# Patient Record
Sex: MALE | Race: WHITE | Employment: FULL TIME | URBAN - METROPOLITAN AREA
[De-identification: names, ages, dates, MRNs, and addresses within clinical notes are randomized per-mention and may not be internally consistent; named-entity substitution may affect disease eponyms.]

---

## 2017-08-25 ENCOUNTER — HOSPITAL ENCOUNTER (OUTPATIENT)
Dept: PHYSICAL THERAPY | Age: 57
Discharge: HOME | End: 2017-08-25
Payer: COMMERCIAL

## 2017-08-25 PROCEDURE — 97162 PT EVAL MOD COMPLEX 30 MIN: CPT

## 2017-08-25 PROCEDURE — 97110 THERAPEUTIC EXERCISES: CPT

## 2017-08-25 PROCEDURE — 97140 MANUAL THERAPY 1/> REGIONS: CPT

## 2017-08-25 NOTE — PROGRESS NOTES
[address] [facility] AT [address], [address] [place] [address] Road  Phone: [phone]   [phone]  PLAN OF CARE / [address] [place] [address] PHYSICAL THERAPY SERVICES  Patient Name: Edwin Mcduffie : 1960   Medical   Diagnosis: Right shoulder pain [M25.511] Treatment Diagnosis: M25.511   Onset Date: 17     Referral Source: Taya Soto MD Start of [facility]): 2017   Prior Hospitalization: See medical history Provider #: [id number]   Prior Level of Function: Independent w/ ADL's   Comorbidities: ^BMI, HTN   Medications: Verified on Patient Summary List   The Plan of Care and following information is based on the information from the initial evaluation.   ===========================================================================================  Assessment / key information:  Patient is a 62 y/o male presenting s/p R RTC repair and SAD (DOS 17). Pt reports traumatic injury 1 year ago falling down the stairs. Since surgery, pt reports good compliance with home sling use and pendulums as directed by surgeon. Abduction brace was removed yesterday. Pain intensity ranges from 2-7/10. Functional deficits include sleeping, inability to reach/lift, impaired washing/dressing ADL's. Patient presents to therapy donning sling on right upper extremity. Patient is mild/mod TTP to the acromion and greater tuberosity. PROM of the R shoulder is measured at 90 degrees flexion, 60 degrees abduction, +5 degrees ER with arm in plane of scapula. AROM/strength testing deferred. The patient was instructed in a home exercise program to address the above findings/deficits.  The patient should benefit from therapy services to progress toward functional goals and improve quality of life.  ===========================================================================================  History MEDIUM  Complexity : 1-2 comorbidities / personal factors will impact the outcome/ POC ; Examination MEDIUM Complexity : 3 Standardized tests and measures addressing body structure, function, activity limitation and / or participation in recreation ; Presentation MEDIUM Complexity : Evolving with changing characteristics ; Decision Making MEDIUM Complexity : FOTO score of 26-74; Complexity MEDIUM  Problem List: pain affecting function, decrease ROM, decrease strength, decrease ADL/ functional abilitiies, decrease activity tolerance and decrease flexibility/ joint mobility   Treatment Plan may include any combination of the following: Therapeutic exercise, Therapeutic activities, Physical agent/modality, Manual therapy and Patient education  Patient / Family readiness to learn indicated by: asking questions, trying to perform skills and interest  Persons(s) to be included in education: patient (P)  Barriers to Learning/Limitations: None  Measures taken:    Patient Goal (s): Shoulder mobility   Patient self reported health status: good  Rehabilitation Potential: good   Short Term Goals: To be accomplished in  6-9  treatments: Independent/compliant with long term HEP for shoulder ROM  Able to actively lift right shoulder to 90 degrees elevation with minimal substitution   Long Term Goals:  To be accomplished in  12-18  treatments:  Pt will report at least 75% ability to reach behind back for washing and bathing  Patient will report at least 75% functional overhead reaching ability to improve ADL's  Improve FOTO outcome score by 25% to indicate a significant improvement in ADL function  Achieve 4/5 strengths or better throughout right shoulder to improve active use of right upper extremity  Frequency / Duration:   Patient to be seen  2-3  times per week for 6  weeks:  Patient / Caregiver education and instruction: activity modification and exercises  G-Codes (GP): JOAQUÍN  Therapist Signature: Monica Allred PT, [facility] Date: 9/60/6324   Certification Period: JOAQUÍN Time: 2:11 PM ===========================================================================================  I certify that the above Physical Therapy Services are being furnished while the patient is under my care. I agree with the treatment plan and certify that this therapy is necessary. Physician Signature:        Date:       Time:     Please sign and return to [facility]ER[facility] or you may fax the signed copy to [phone]. Thank you.

## 2017-08-25 NOTE — PROGRESS NOTES
SHOULDER EVALUATION/ DAILY TREATMENT NOTE    Patient Name: John Cruz  Date:2017  : 1960  [x]  Patient  Verified  Payor: Maritza Bone / Plan: Suhas Gaston / Product Type: HMO /    In time:1:00  Out time:2:00  Total Treatment Time (min): 60  Total Timed Codes (min): 60  1:1 Treatment Time ( only):    Visit #: 1     Treatment Area: Right shoulder pain [M25.511]    SUBJECTIVE HISTORY:   Patient is a 60 y/o male presenting s/p R RTC repair and SAD (DOS 17). Pt reports good compliance with home sling use and pendulums as directed by surgeon. Abduction brace was removed yesterday. Pain intensity ranges from 2-7/10. Pain Level (0-10 scale): 2    Posture: Rounded shoulders R>L, R UE in sling    ROM:  [] Unable to assess at this time                                           AROM                                                              PROM   Left Right  Left Right   Flexion  NT Flexion  90   Extension   Extension     Abduction   Abduction  60   ER @ 0 Degrees   ER @ 0 Degrees  +5 degrees   ER @ 90 Degrees   ER @ 90 Degrees     IR @ 90 Degrees   IR @ 90 Degrees     Full elbow flex/ext    End Feel / Painful Arc:    Strength:   [x] Unable to assess at this time                                                                            L (1-5) R (1-5) Pain   Flexors   [] Yes   [] No   Abductors   [] Yes   [] No   External Rotators   [] Yes   [] No   Internal Rotators   [] Yes   [] No   Supraspinatus   [] Yes   [] No   Serratus Anterior   [] Yes   [] No   Lower Trapezius   [] Yes   [] No   Elbow Flexion   [] Yes   [] No   Elbow Extension   [] Yes   [] No       Scapulohumoral Control / Rhythm: NA  Able to eccentrically lower with good control?  Left: [] Yes   [] No     Right: [] Yes   [] No      Palpation:  [] Min  [x] Mod  [] Severe    Location: Right acromion, greater tuberosity  [] Min  [] Mod  [] Severe    Location:  [] Min  [] Mod  [] Severe    Location:    Special Tests:  Adson's Test  [] Pos   [] Neg Yergason's Test [] Pos   [] Neg  Mgegan's Test  [] Pos   [] Neg ER Lag Sign     [] Pos   [] Neg  Neer's Test  [] Pos   [] Neg IR Lift Off Sign  [] Pos   [] Neg  Hawkin's Test  [] Pos   [] Neg AC Joint  [] Pos   [] Neg  Speed's Test  [] Pos   [] Neg SC Joint  [] Pos   [] Neg  Empty Can  [] Pos   [] Neg Pectoral Tightness [] Pos   [] Neg  Anterior Apprehension [] Pos   [] Neg   Posterior Apprehension [] Pos   [] Neg  Other:     OBJECTIVE TREATMENT:  Modality (rationale):   []  E-Stim: type _ x _ min     []att   []unatt   []w/US   []w/ice   []w/heat  []  Ultrasound: []cont   []pulse    _ W/cm2 x _  min   []1MHz   []3MHz  []  Iontophoresis: []take home patch w/ dexamethazone    []40mA   []80mA                               []_ mA min w/: []dexamethazone   []other:_  []  Ice pack _  min     [] Hot pack _  min     [] Paraffin _  min  []  Other:     Therapeutic Exercise: [x] see flow sheet     [] Other:_  Added/Changed Exercises:  [x]  Added:See HEP_  to improve (function): Shoulder mobility to improve ADL's  []  Changed:_ to improve (function):  (minutes:10 )    Therapeutic Activity:   (minutes: )    Manual Therapy: PROM of the R scapula and GH joint  (minutes: 10)    Other Objective/Functional Measures:   Pain Level (0-10 scale) post treatment: 0    ASSESSMENT  [x]  See Plan of Care  Patient is assigned a medium evaluation complexity based upon presence of BMI/HTN, FOTO score, and changing/post-op symptom characteristics.     PLAN  See  Jose Cantu 8/25/2017  1:13 PM

## 2017-08-29 ENCOUNTER — HOSPITAL ENCOUNTER (OUTPATIENT)
Dept: PHYSICAL THERAPY | Age: 57
Discharge: HOME | End: 2017-08-29
Payer: COMMERCIAL

## 2017-08-29 PROCEDURE — 97110 THERAPEUTIC EXERCISES: CPT

## 2017-08-29 PROCEDURE — 97140 MANUAL THERAPY 1/> REGIONS: CPT

## 2017-08-29 PROCEDURE — 97016 VASOPNEUMATIC DEVICE THERAPY: CPT

## 2017-08-29 NOTE — PROGRESS NOTES
PT DAILY TREATMENT NOTE     Patient Name: Rodney Geiger  Date:2017  : 1960  [x]  Patient  Verified  Payor: Karen Williamson / Plan: Noelle Mcgraw / Product Type: HMO /    In time:2:02  Out time:2:44  Total Treatment Time (min): 42  Total Timed Codes (min): 42  1:1 Treatment Time (min):    Visit #: 2 of     Treatment Area: Right shoulder pain [M25.511]    SUBJECTIVE  Pain Level (0-10 scale): 4  Any medication changes, allergies to medications, adverse drug reactions, diagnosis change, or new procedure performed?: [x] No    [] Yes (see summary sheet for update)  Subjective functional status/changes:   [] No changes reported  My shoulder is pretty sore, I just took it out of the sling     OBJECTIVE  Modality rationale: decrease pain to improve the patients ability to change upper limb position for lifting, reaching and dressing tasks   Min Type Additional Details    [] Estim: []Att   []Unatt        []TENS instruct                  []IFC  []Premod   []NMES                     []Other:  []w/US   []w/ice   []w/heat  Position:  Location:    []  Traction: [] Cervical       []Lumbar                       [] Prone          []Supine                       []Intermittent   []Continuous Lbs:  [] before manual  [] after manual    []  Ultrasound: []Continuous   [] Pulsed                           []1MHz   []3MHz Location:  W/cm2:    []  Iontophoresis with dexamethasone         Location: [] Take home patch   [] In clinic    []  Ice     []  heat  []  Ice massage Position:  Location:   10 [x]  Vasopneumatic Device Pressure:       [] lo [x] med [] hi   Temperature: [x] lo [] med [] hi   [] Skin assessment post-treatment:  []intact []redness- no adverse reaction       []redness  adverse reaction:       22 min Therapeutic Exercise:  [] See flow sheet :   Rationale: increase ROM to improve the patients ability to change upper limb position for lifting, reaching and dressing tasks    10 min Manual Therapy: PROM of the R scap and [address] in all planes   Rationale: increase ROM and increase tissue extensibility to improve the patient's ability to change upper limb position for lifting, reaching and dressing tasks      Pain Level (0-10 scale) post treatment: 3    ASSESSMENT/Changes in Function: Pt was instructed to remain conservative with active use of involved arm now that he is out of the sling, but to remain active with HEP and encourage normal arm position and swing with walking and other ADL's. Patient will continue to benefit from skilled PT services to modify and progress therapeutic interventions, address ROM deficits, address strength deficits and analyze and cue movement patterns to attain remaining goals.      []  See Plan of Care  []  See progress note/recertification  []  See Discharge Summary           PLAN  []  Upgrade activities as tolerated     [x]  Continue plan of care  []  Update interventions per flow sheet       []  Discharge due to:_  []  Other:_      Sima Carpenter, PT 8/29/2017  2:44 PM

## 2017-08-31 ENCOUNTER — HOSPITAL ENCOUNTER (OUTPATIENT)
Dept: PHYSICAL THERAPY | Age: 57
Discharge: HOME | End: 2017-08-31
Payer: COMMERCIAL

## 2017-08-31 PROCEDURE — 97110 THERAPEUTIC EXERCISES: CPT

## 2017-08-31 PROCEDURE — 97140 MANUAL THERAPY 1/> REGIONS: CPT

## 2017-08-31 PROCEDURE — 97016 VASOPNEUMATIC DEVICE THERAPY: CPT

## 2017-08-31 NOTE — PROGRESS NOTES
PT DAILY TREATMENT NOTE     Patient Name: Jamir Draper  Date:2017  : 1960  [x]  Patient  Verified  Payor: Rosendo Oseguera / Plan: Polo Chan / Product Type: HMO /    In time:1:57  Out time:2:50  Total Treatment Time (min): 48  Visit #: 3 of     Treatment Area: Right shoulder pain [M25.511]    SUBJECTIVE  Pain Level (0-10 scale):5  Any medication changes, allergies to medications, adverse drug reactions, diagnosis change, or new procedure performed?: [x] No    [] Yes (see summary sheet for update)  Subjective functional status/changes:   [] No changes reported  \"Its been really really sore\"    OBJECTIVE  Modality rationale: decrease pain to improve the patients ability to change upper limb position for lifting, reaching and dressing tasks   Min Type Additional Details    [] Estim: []Att   []Unatt        []TENS instruct                  []IFC  []Premod   []NMES                     []Other:  []w/US   []w/ice   []w/heat  Position:  Location:    []  Traction: [] Cervical       []Lumbar                       [] Prone          []Supine                       []Intermittent   []Continuous Lbs:  [] before manual  [] after manual    []  Ultrasound: []Continuous   [] Pulsed                           []1MHz   []3MHz Location:  W/cm2:    []  Iontophoresis with dexamethasone         Location: [] Take home patch   [] In clinic    []  Ice     []  heat  []  Ice massage Position:  Location:   10 [x]  Vasopneumatic Device Pressure:       [] lo [x] med [] hi   Temperature: [x] lo [] med [] hi   [] Skin assessment post-treatment:  []intact []redness- no adverse reaction       []redness  adverse reaction:       33 min Therapeutic Exercise:  [] See flow sheet :   Rationale: increase ROM to improve the patients ability to change upper limb position for lifting, reaching and dressing tasks    10 min Manual Therapy:  PROM of the R scap and GH in all planes   Rationale: increase ROM and increase tissue extensibility to improve the patient's ability to change upper limb position for lifting, reaching and dressing tasks      Pain Level (0-10 scale) post treatment: 5    ASSESSMENT/Changes in Function: Pt continues to have increased soreness of the R shoulder following decreased use of the sling. Pt reports he walked 2.5 miles two days ago and reports trying to perform normal arm swing. Will continue to progress therex within current POC as patient is able. Patient will continue to benefit from skilled PT services to modify and progress therapeutic interventions, address ROM deficits, address strength deficits and analyze and cue movement patterns to attain remaining goals.      []  See Plan of Care  []  See progress note/recertification  []  See Discharge Summary           PLAN  []  Upgrade activities as tolerated     [x]  Continue plan of care  []  Update interventions per flow sheet       []  Discharge due to:_  []  Other:_      Eli Dunlap, PT 8/31/2017  2:44 PM

## 2017-09-05 ENCOUNTER — HOSPITAL ENCOUNTER (OUTPATIENT)
Dept: PHYSICAL THERAPY | Age: 57
Discharge: HOME | End: 2017-09-05
Payer: COMMERCIAL

## 2017-09-05 PROCEDURE — 97016 VASOPNEUMATIC DEVICE THERAPY: CPT

## 2017-09-05 PROCEDURE — 97140 MANUAL THERAPY 1/> REGIONS: CPT

## 2017-09-05 PROCEDURE — 97110 THERAPEUTIC EXERCISES: CPT

## 2017-09-05 NOTE — PROGRESS NOTES
PT DAILY TREATMENT NOTE     Patient Name: Cally Puckett  Date:2017  : 1960  [x]  Patient  Verified  Payor: Maria Del Carmen Mccullough / Plan: Real Colla / Product Type: HMO /    In time:203  Out time:258  Total Treatment Time (min):55  Visit #: 4 of     Treatment Area: Right shoulder pain [M25.511]    SUBJECTIVE  Pain Level (0-10 scale):2-3  Any medication changes, allergies to medications, adverse drug reactions, diagnosis change, or new procedure performed?: [x] No    [] Yes (see summary sheet for update)  Subjective functional status/changes:   [] No changes reported  \"Im still pretty sore\"    OBJECTIVE  Modality rationale: decrease pain to improve the patients ability to change upper limb position for lifting, reaching and dressing tasks   Min Type Additional Details    [] Estim: []Att   []Unatt        []TENS instruct                  []IFC  []Premod   []NMES                     []Other:  []w/US   []w/ice   []w/heat  Position:  Location:    []  Traction: [] Cervical       []Lumbar                       [] Prone          []Supine                       []Intermittent   []Continuous Lbs:  [] before manual  [] after manual    []  Ultrasound: []Continuous   [] Pulsed                           []1MHz   []3MHz Location:  W/cm2:    []  Iontophoresis with dexamethasone         Location: [] Take home patch   [] In clinic    []  Ice     []  heat  []  Ice massage Position:  Location:   10 [x]  Vasopneumatic Device Pressure:       [] lo [x] med [] hi   Temperature: [x] lo [] med [] hi   [] Skin assessment post-treatment:  []intact []redness- no adverse reaction       []redness  adverse reaction:       35 min Therapeutic Exercise:  [] See flow sheet :   Rationale: increase ROM to improve the patients ability to change upper limb position for lifting, reaching and dressing tasks    10 min Manual Therapy:  PROM of the R scap and GH in all planes   Rationale: increase ROM and increase tissue extensibility to improve the patient's ability to change upper limb position for lifting, reaching and dressing tasks      Pain Level (0-10 scale) post treatment: 5    ASSESSMENT/Changes in Function: Pt was able to progress to wall ladder today, however was educated on importance of only going to point he begins to feel pain and not push past that point towards sharp and stabbing pain. Pt tolerated eccentric lowering on the ladder well with no reports of pain. Will continue to progress therex within current POC as patient is able. Patient will continue to benefit from skilled PT services to modify and progress therapeutic interventions, address ROM deficits, address strength deficits and analyze and cue movement patterns to attain remaining goals.      []  See Plan of Care  []  See progress note/recertification  []  See Discharge Summary           PLAN  []  Upgrade activities as tolerated     [x]  Continue plan of care  []  Update interventions per flow sheet       []  Discharge due to:_  []  Other:_      Ronda Portillo, PT 9/5/2017

## 2017-09-07 ENCOUNTER — HOSPITAL ENCOUNTER (OUTPATIENT)
Dept: PHYSICAL THERAPY | Age: 57
Discharge: HOME | End: 2017-09-07
Payer: COMMERCIAL

## 2017-09-07 PROCEDURE — 97110 THERAPEUTIC EXERCISES: CPT

## 2017-09-07 PROCEDURE — 97016 VASOPNEUMATIC DEVICE THERAPY: CPT

## 2017-09-07 PROCEDURE — 97140 MANUAL THERAPY 1/> REGIONS: CPT

## 2017-09-07 NOTE — PROGRESS NOTES
PT DAILY TREATMENT NOTE     Patient Name: Tom Code  Date:2017  : 1960  [x]  Patient  Verified  Payor: Gege Hammond / Plan: Nani Mercer / Product Type: HMO /    In time:200  Out time:257  Total Treatment Time (min):57  Visit #: 6 of     Treatment Area: Right shoulder pain [M25.511]    SUBJECTIVE  Pain Level (0-10 scale):7  Any medication changes, allergies to medications, adverse drug reactions, diagnosis change, or new procedure performed?: [x] No    [] Yes (see summary sheet for update)  Subjective functional status/changes:   [] No changes reported  \"It is really sore today\"    OBJECTIVE  Modality rationale: decrease pain to improve the patients ability to change upper limb position for lifting, reaching and dressing tasks   Min Type Additional Details    [] Estim: []Att   []Unatt        []TENS instruct                  []IFC  []Premod   []NMES                     []Other:  []w/US   []w/ice   []w/heat  Position:  Location:    []  Traction: [] Cervical       []Lumbar                       [] Prone          []Supine                       []Intermittent   []Continuous Lbs:  [] before manual  [] after manual    []  Ultrasound: []Continuous   [] Pulsed                           []1MHz   []3MHz Location:  W/cm2:    []  Iontophoresis with dexamethasone         Location: [] Take home patch   [] In clinic    []  Ice     []  heat  []  Ice massage Position:  Location:   10 [x]  Vasopneumatic Device Pressure:       [] lo [x] med [] hi   Temperature: [x] lo [] med [] hi   [] Skin assessment post-treatment:  []intact []redness- no adverse reaction       []redness  adverse reaction:       32 min Therapeutic Exercise:  [] See flow sheet :   Rationale: increase ROM to improve the patients ability to change upper limb position for lifting, reaching and dressing tasks    15 min Manual Therapy:  Gentle sidelying scapular mobs, R biceps stretching, gentle PROM into flexion, scaption and ER. Rationale: increase ROM and increase tissue extensibility to improve the patient's ability to change upper limb position for lifting, reaching and dressing tasks      Pain Level (0-10 scale) post treatment: 4    ASSESSMENT/Changes in Function: Pt presented today with increased pain and soreness in the R shoulder rating 7/10. Patient denies any activities at home to increase pain. Pt reports washing his hair and reporting no pain. Pt was educated on no active movement at this time in the R shoulder above 90 degrees as well as changing sleeping pattern to try and avoid sleeping on the R. Pt noted increased tightness of the R UT and R rhomboid today with noted trigger points. Biceps stretching was added to address tightness in the biceps and forearm. Will continue to progress therex within current POC as patient is able. Patient will continue to benefit from skilled PT services to modify and progress therapeutic interventions, address ROM deficits, address strength deficits and analyze and cue movement patterns to attain remaining goals.      []  See Plan of Care  []  See progress note/recertification  []  See Discharge Summary           PLAN  []  Upgrade activities as tolerated     [x]  Continue plan of care  []  Update interventions per flow sheet       []  Discharge due to:_  []  Other:_      Heloise Hamman, PT 9/7/2017

## 2017-09-12 ENCOUNTER — HOSPITAL ENCOUNTER (OUTPATIENT)
Dept: PHYSICAL THERAPY | Age: 57
Discharge: HOME | End: 2017-09-12
Payer: COMMERCIAL

## 2017-09-12 PROCEDURE — 97110 THERAPEUTIC EXERCISES: CPT

## 2017-09-12 PROCEDURE — 97016 VASOPNEUMATIC DEVICE THERAPY: CPT

## 2017-09-12 PROCEDURE — 97140 MANUAL THERAPY 1/> REGIONS: CPT

## 2017-09-12 NOTE — PROGRESS NOTES
PT DAILY TREATMENT NOTE     Patient Name: Nitish Sewell  Date:2017  : 1960  [x]  Patient  Verified  Payor: Lore Conception / Plan: Washington Maciel / Product Type: HMO /    In time:200  Out time:258  Total Treatment Time (min):58  Visit #: 6 of     Treatment Area: Right shoulder pain [M25.511]    SUBJECTIVE  Pain Level (0-10 scale):4  Any medication changes, allergies to medications, adverse drug reactions, diagnosis change, or new procedure performed?: [x] No    [] Yes (see summary sheet for update)  Subjective functional status/changes:   [] No changes reported  \"I dont know what it was but I had a really hard time last time.  But it feels much better then the other day\"    OBJECTIVE  Modality rationale: decrease pain to improve the patients ability to change upper limb position for lifting, reaching and dressing tasks   Min Type Additional Details    [] Estim: []Att   []Unatt        []TENS instruct                  []IFC  []Premod   []NMES                     []Other:  []w/US   []w/ice   []w/heat  Position:  Location:    []  Traction: [] Cervical       []Lumbar                       [] Prone          []Supine                       []Intermittent   []Continuous Lbs:  [] before manual  [] after manual    []  Ultrasound: []Continuous   [] Pulsed                           []1MHz   []3MHz Location:  W/cm2:    []  Iontophoresis with dexamethasone         Location: [] Take home patch   [] In clinic    []  Ice     []  heat  []  Ice massage Position:  Location:   10 [x]  Vasopneumatic Device Pressure:       [] lo [x] med [] hi   Temperature: [x] lo [] med [] hi   [] Skin assessment post-treatment:  []intact []redness- no adverse reaction       []redness  adverse reaction:       36 min Therapeutic Exercise:  [] See flow sheet :   Rationale: increase ROM to improve the patients ability to change upper limb position for lifting, reaching and dressing tasks    12 min Manual Therapy:  Gentle sidelying scapular mobs, R biceps stretching, gentle PROM into flexion, scaption and ER. Rationale: increase ROM and increase tissue extensibility to improve the patient's ability to change upper limb position for lifting, reaching and dressing tasks      Pain Level (0-10 scale) post treatment: 3    ASSESSMENT/Changes in Function: Pt reports improved pain since last visit. Pt denies pain similar to what was present last visit, however continues to have soreness in the R biceps and R posterior shoulder. Pt was able to complete full therex today with moderate fatigue and moderate discomfort, however much improved from last visit. Pt continues to have increased pain in the R biceps with cane ER today and continues to require cuing for decreased \"pushing\" through the exercises towards pain. Patient will continue to benefit from skilled PT services to modify and progress therapeutic interventions, address ROM deficits, address strength deficits and analyze and cue movement patterns to attain remaining goals.      []  See Plan of Care  []  See progress note/recertification  []  See Discharge Summary           PLAN  []  Upgrade activities as tolerated     [x]  Continue plan of care  []  Update interventions per flow sheet       []  Discharge due to:_  []  Other:_      Juani Burleson, PT 9/12/2017

## 2017-09-14 ENCOUNTER — HOSPITAL ENCOUNTER (OUTPATIENT)
Dept: PHYSICAL THERAPY | Age: 57
Discharge: HOME | End: 2017-09-14
Payer: COMMERCIAL

## 2017-09-14 PROCEDURE — 97016 VASOPNEUMATIC DEVICE THERAPY: CPT

## 2017-09-14 PROCEDURE — 97110 THERAPEUTIC EXERCISES: CPT

## 2017-09-14 PROCEDURE — 97140 MANUAL THERAPY 1/> REGIONS: CPT

## 2017-09-14 NOTE — PROGRESS NOTES
PT DAILY TREATMENT NOTE     Patient Name: Haim Leiva  Date:2017  : 1960  [x]  Patient  Verified  Payor: Paige Turner / Plan: Bridgette Thakkar / Product Type: HMO /    In time:205 Out time:304  Total Treatment Time (min):59  Visit #: 7 of      Treatment Area: Right shoulder pain [M25.511]    SUBJECTIVE  Pain Level (0-10 scale):0  Any medication changes, allergies to medications, adverse drug reactions, diagnosis change, or new procedure performed?: [x] No    [] Yes (see summary sheet for update)  Subjective functional status/changes:   [] No changes reported  Pt continues to report increased soreness in the R shoulder and biceps tendon    OBJECTIVE  Modality rationale: decrease pain to improve the patients ability to change upper limb position for lifting, reaching and dressing tasks   Min Type Additional Details    [] Estim: []Att   []Unatt        []TENS instruct                  []IFC  []Premod   []NMES                     []Other:  []w/US   []w/ice   []w/heat  Position:  Location:    []  Traction: [] Cervical       []Lumbar                       [] Prone          []Supine                       []Intermittent   []Continuous Lbs:  [] before manual  [] after manual    []  Ultrasound: []Continuous   [] Pulsed                           []1MHz   []3MHz Location:  W/cm2:    []  Iontophoresis with dexamethasone         Location: [] Take home patch   [] In clinic    []  Ice     []  heat  []  Ice massage Position:  Location:   10 [x]  Vasopneumatic Device Pressure:       [] lo [x] med [] hi   Temperature: [x] lo [] med [] hi   [] Skin assessment post-treatment:  []intact []redness- no adverse reaction       []redness  adverse reaction:       37 min Therapeutic Exercise:  [] See flow sheet :   Rationale: increase ROM to improve the patients ability to change upper limb position for lifting, reaching and dressing tasks    12 min Manual Therapy:  Gentle sidelying scapular mobs, R biceps stretching, gentle PROM into flexion, scaption and ER. Rationale: increase ROM and increase tissue extensibility to improve the patient's ability to change upper limb position for lifting, reaching and dressing tasks      Pain Level (0-10 scale) post treatment: 5    ASSESSMENT/Changes in Function: Added R UT stretch today to address tightness in the posterior shoulder and upper trap. Pt continues to progress well with PROM and no pain while performing PROM. Will continue to progress therex within current POC as patient is able. Patient will continue to benefit from skilled PT services to modify and progress therapeutic interventions, address ROM deficits, address strength deficits and analyze and cue movement patterns to attain remaining goals.      []  See Plan of Care  []  See progress note/recertification  []  See Discharge Summary           PLAN  []  Upgrade activities as tolerated     [x]  Continue plan of care  []  Update interventions per flow sheet       []  Discharge due to:_  []  Other:_      Maddy Dunlap, PT 9/14/2017

## 2017-09-19 ENCOUNTER — HOSPITAL ENCOUNTER (OUTPATIENT)
Dept: PHYSICAL THERAPY | Age: 57
Discharge: HOME | End: 2017-09-19
Payer: COMMERCIAL

## 2017-09-19 PROCEDURE — 97016 VASOPNEUMATIC DEVICE THERAPY: CPT

## 2017-09-19 PROCEDURE — 97110 THERAPEUTIC EXERCISES: CPT

## 2017-09-19 PROCEDURE — 97140 MANUAL THERAPY 1/> REGIONS: CPT

## 2017-09-19 NOTE — PROGRESS NOTES
PT DAILY TREATMENT NOTE     Patient Name: Breann Nicole  Date:2017  : 1960  [x]  Patient  Verified  Payor: Ada Willoughby / Plan: Talib Acharya / Product Type: HMO /    In time:200 Out time:300  Total Treatment Time (min):60  Visit #: 8      Treatment Area: Right shoulder pain [M25.511]    SUBJECTIVE  Pain Level (0-10 scale): 2  Any medication changes, allergies to medications, adverse drug reactions, diagnosis change, or new procedure performed?: [x] No    [] Yes (see summary sheet for update)  Subjective functional status/changes:   [] No changes reported  \"I am getting some shooting pains down in the biceps\"    OBJECTIVE  Modality rationale: decrease pain to improve the patients ability to change upper limb position for lifting, reaching and dressing tasks   Min Type Additional Details    [] Estim: []Att   []Unatt        []TENS instruct                  []IFC  []Premod   []NMES                     []Other:  []w/US   []w/ice   []w/heat  Position:  Location:    []  Traction: [] Cervical       []Lumbar                       [] Prone          []Supine                       []Intermittent   []Continuous Lbs:  [] before manual  [] after manual    []  Ultrasound: []Continuous   [] Pulsed                           []1MHz   []3MHz Location:  W/cm2:    []  Iontophoresis with dexamethasone         Location: [] Take home patch   [] In clinic    []  Ice     []  heat  []  Ice massage Position:  Location:   10 [x]  Vasopneumatic Device Pressure:       [] lo [x] med [] hi   Temperature: [x] lo [] med [] hi   [] Skin assessment post-treatment:  []intact []redness- no adverse reaction       []redness  adverse reaction:       38 min Therapeutic Exercise:  [] See flow sheet :   Rationale: increase ROM to improve the patients ability to change upper limb position for lifting, reaching and dressing tasks    12 min Manual Therapy:  Gentle sidelying scapular mobs, R biceps stretching, gentle PROM into flexion, scaption and ER. Rationale: increase ROM and increase tissue extensibility to improve the patient's ability to change upper limb position for lifting, reaching and dressing tasks      Pain Level (0-10 scale) post treatment: 4    ASSESSMENT/Changes in Function:Pt reported better pain levels today with main c/o soreness and \"shooting pains\" into the R biceps. Pt was able to tolerate the addition of cane therex for biceps stretching and shoulder ext. Pt continues to have tightness at end range PROM. Pt was unable to perform the Cane PROM ER due to intense cramping sensations in the back of the L elbow and into the biceps tendon. Will continue to progress therex within current POC as patient is able. Patient will continue to benefit from skilled PT services to modify and progress therapeutic interventions, address ROM deficits, address strength deficits and analyze and cue movement patterns to attain remaining goals.      []  See Plan of Care  []  See progress note/recertification  []  See Discharge Summary           PLAN  []  Upgrade activities as tolerated     [x]  Continue plan of care  []  Update interventions per flow sheet       []  Discharge due to:_  []  Other:_      Michael Dunlap, PT 9/19/2017

## 2017-09-21 ENCOUNTER — APPOINTMENT (OUTPATIENT)
Dept: PHYSICAL THERAPY | Age: 57
End: 2017-09-21
Payer: COMMERCIAL

## 2017-09-22 ENCOUNTER — HOSPITAL ENCOUNTER (OUTPATIENT)
Dept: PHYSICAL THERAPY | Age: 57
Discharge: HOME | End: 2017-09-22
Payer: COMMERCIAL

## 2017-09-22 PROCEDURE — 97016 VASOPNEUMATIC DEVICE THERAPY: CPT

## 2017-09-22 PROCEDURE — 97110 THERAPEUTIC EXERCISES: CPT

## 2017-09-22 PROCEDURE — 97140 MANUAL THERAPY 1/> REGIONS: CPT

## 2017-09-22 NOTE — PROGRESS NOTES
PT DAILY TREATMENT NOTE     Patient Name: María Elena Morrow  Date:2017  : 1960  [x]  Patient  Verified  Payor: Olga Ornelas / Plan: Janett Clarke / Product Type: HMO /    In time:11:04  Out time:12:18  Total Treatment Time (min): 74  Total Timed Codes (min): 74  1:1 Treatment Time (min):    Visit #:     Treatment Area: Right shoulder pain [M25.511]    SUBJECTIVE  Pain Level (0-10 scale): 3/10  Any medication changes, allergies to medications, adverse drug reactions, diagnosis change, or new procedure performed?: [x] No    [] Yes (see summary sheet for update)  Subjective functional status/changes:   [] No changes reported  My shoulder has been doing pretty good, but I still have that one spot on the lower part of my biceps that is irritating that just won't heel.      OBJECTIVE  Modality rationale: decrease pain and increase tissue extensibility to improve the patients ability to improve functional abilities   Min Type Additional Details    [] Estim: []Att   []Unatt        []TENS instruct                  []IFC  []Premod   []NMES                     []Other:  []w/US   []w/ice   []w/heat  Position:  Location:    []  Traction: [] Cervical       []Lumbar                       [] Prone          []Supine                       []Intermittent   []Continuous Lbs:  [] before manual  [] after manual    []  Ultrasound: []Continuous   [] Pulsed                           []1MHz   []3MHz Location:  W/cm2:    []  Iontophoresis with dexamethasone         Location: [] Take home patch   [] In clinic    []  Ice     []  heat  []  Ice massage Position:  Location:   10 [x]  Vasopneumatic Device Pressure:       [x] lo [] med [] hi   Temperature: [x] lo [] med [] hi   [] Skin assessment post-treatment:  []intact []redness- no adverse reaction       []redness  adverse reaction:       52 min Therapeutic Exercise:  [x] See flow sheet :   Rationale: increase ROM to improve the patients ability to improve functional abilities    12 min Manual Therapy:   Gentle sidelying scapular mobs, R biceps stretching, gentle PROM into flexion, scaption and ER. Rationale: increase ROM and increase tissue extensibility to improve functional mobility           min Patient Education: [x] Review HEP    [] Progressed/Changed HEP based on:   [] positioning   [] body mechanics   [] transfers   [] heat/ice application        Other Objective/Functional Measures:     Pain Level (0-10 scale) post treatment: 4/10    ASSESSMENT/Changes in Function:     Patient will continue to benefit from skilled PT services to modify and progress therapeutic interventions, address functional mobility deficits, address ROM deficits, address strength deficits, analyze and address soft tissue restrictions, analyze and cue movement patterns, analyze and modify body mechanics/ergonomics and assess and modify postural abnormalities to attain remaining goals. []  See Plan of Care  []  See progress note/recertification  []  See Discharge Summary         Progress towards goals / Updated goals:  See Progress note/Physician update for full detailed progress towards established goals.     PLAN  [x]  Upgrade activities as tolerated     []  Continue plan of care  []  Update interventions per flow sheet       []  Discharge due to:_  []  Other:_      Jaylene Henry, ADALBERTO 9/22/2017  11:15 AM

## 2017-09-22 NOTE — PROGRESS NOTES
[facility] [place] [address], [place], [address] [place] [address] Road  Phone: [phone]   [phone]  PROGRESS NOTE  Patient Name: Angel Raymundo : 1960   Treatment/Medical Diagnosis: Right shoulder pain [M25.511]   Referral Source: Vick John MD     Date of Initial Visit: 17 Attended Visits: 9 Missed Visits: 0     SUMMARY OF TREATMENT  Therapeutic exercise for shoulder/scapular mobility within guidelines of passive/active assistive protocol, postural awareness, manual intervention, cryotherapy and HEP. CURRENT STATUS  Patient reports approximately 60% overall improvement from therapy since initial evaluation with 3/10 pain level on average, increased to 7/10 at the worst after therapy sessions as well as accidentally rolling onto involved side while sleeping. Pt has been performing exercises and stretches within passive/active assistive protocol, but has not advanced to active protocol as of yet. Pt should be able to progress to trial with active exercises over the next few treatments as tolerated. Will continue to progress/advance patient within current POC as tolerated with monitoring symptoms. R shoulder PROM:  Flexion= 135 degrees; Kfhyspix=389 degrees; ER=32 degrees; IR=full (PROM ER and IR measured with elbow at side in neutral)  Goal/Measure of Progress Goal Met? 1.  Able to actively lift right shoulder to 90 degrees elevation with minimal substitution   Status at last Eval: Progressing Current Status: Pt has not advanced to active protocol yet no   2. Pt will report at least 75% ability to reach behind back for washing and bathing   Status at last Eval: Progressing Current Status: Pt has not advanced to active protocol yet no   3. Patient will report at least 75% functional overhead reaching ability to improve ADL's   Status at last Eval: Progressing Current Status: Pt has not advanced to active protocol yet no   4. Improve FOTO outcome score by 25% to indicate a significant improvement in ADL function   Status at last Eval: 41/100 Current Status: 52/100 no     New Goals to be achieved in __10__  treatments:  1. Able to actively lift right shoulder to 90 degrees elevation with minimal substitution   2. Pt will report at least 75% ability to reach behind back for washing and bathing   3. Patient will report at least 75% functional overhead reaching ability to improve ADL's   4. Improve FOTO outcome score by 25% to indicate a significant improvement in ADL function     RECOMMENDATIONS  Continue with current POC for 10 additional visits with advancing as tolerated, then reassess for the need for continuation or discharge from therapy. If you have any questions/comments please contact us directly at [phone]. Thank you for allowing us to assist in the care of your patient. LPTA Signature: Joanne Cedeño PTA  Date: 9/22/2017   PT Signature: JOHANA Bashir, [facility]   Time: 11:22 AM   NOTE TO PHYSICIAN:  PLEASE COMPLETE THE ORDERS BELOW AND FAX TO   [facility] UNIT: [phone]. If you are unable to process this request in 24 hours please contact our office: [phone].    ___ I have read the above report and request that my patient continue as recommended.   ___ I have read the above report and request that my patient continue therapy with the following changes/special instructions:_________________________________________________________   ___ I have read the above report and request that my patient be discharged from therapy.      Physician Signature:        Date:       Time:

## 2017-09-27 ENCOUNTER — HOSPITAL ENCOUNTER (OUTPATIENT)
Dept: PHYSICAL THERAPY | Age: 57
Discharge: HOME | End: 2017-09-27
Payer: COMMERCIAL

## 2017-09-27 ENCOUNTER — APPOINTMENT (OUTPATIENT)
Dept: PHYSICAL THERAPY | Age: 57
End: 2017-09-27
Payer: COMMERCIAL

## 2017-09-27 PROCEDURE — 97140 MANUAL THERAPY 1/> REGIONS: CPT

## 2017-09-27 PROCEDURE — 97110 THERAPEUTIC EXERCISES: CPT

## 2017-09-27 NOTE — PROGRESS NOTES
PT DAILY TREATMENT NOTE     Patient Name: Mima Flannery  Date:2017  : 1960  [x]  Patient  Verified  Payor: Briseida Barahona / Plan: Oli Williamson / Product Type: HMO /    In [id number]  Out time:349  Total Treatment Time (min): 78   Visit #:10 of     Treatment Area: Right shoulder pain [M25.511]    SUBJECTIVE  Pain Level (0-10 scale): 3-4  Any medication changes, allergies to medications, adverse drug reactions, diagnosis change, or new procedure performed?: [x] No    [] Yes (see summary sheet for update)  Subjective functional status/changes:   [] No changes reported  Pt reports constant soreness in the R shoulder over the last several weeks with increased spasms in the R shoulder.      OBJECTIVE  Modality rationale: decrease pain and increase tissue extensibility to improve the patients ability to improve functional abilities   Min Type Additional Details    [] Estim: []Att   []Unatt        []TENS instruct                  []IFC  []Premod   []NMES                     []Other:  []w/US   []w/ice   []w/heat  Position:  Location:    []  Traction: [] Cervical       []Lumbar                       [] Prone          []Supine                       []Intermittent   []Continuous Lbs:  [] before manual  [] after manual    []  Ultrasound: []Continuous   [] Pulsed                           []1MHz   []3MHz Location:  W/cm2:    []  Iontophoresis with dexamethasone         Location: [] Take home patch   [] In clinic    []  Ice     []  heat  []  Ice massage Position:  Location:   10 [x]  Vasopneumatic Device Pressure:       [x] lo [] med [] hi   Temperature: [x] lo [] med [] hi   [] Skin assessment post-treatment:  []intact []redness- no adverse reaction       []redness  adverse reaction:       57 min Therapeutic Exercise:  [x] See flow sheet :   Rationale: increase ROM to improve the patients ability to improve functional abilities    12 min Manual Therapy:   Gentle sidelying scapular mobs, R biceps stretching, gentle PROM into flexion, scaption and ER. Rationale: increase ROM and increase tissue extensibility to improve functional mobility           min Patient Education: [x] Review HEP    [] Progressed/Changed HEP based on:   [] positioning   [] body mechanics   [] transfers   [] heat/ice application        Other Objective/Functional Measures:     Pain Level (0-10 scale) post treatment: 5    ASSESSMENT/Changes in Function: Pt continues to verbalize increased soreness that remains constant in the R anterior shoulder. Pt will follow up with MD for scheduled visit next week. Pt was able to progress towards mild AROM therex today with moderate difficulty noted. Pt is approximately 12 weeks post op today and seems to be progressing relatively slowly with changes in pain and only mild improvements in shoulder mobility. Will continue to progress therex within current POC as patient is able. Patient will continue to benefit from skilled PT services to modify and progress therapeutic interventions, address functional mobility deficits, address ROM deficits, address strength deficits, analyze and address soft tissue restrictions, analyze and cue movement patterns, analyze and modify body mechanics/ergonomics and assess and modify postural abnormalities to attain remaining goals.      []  See Plan of Care  []  See progress note/recertification  []  See Discharge Summary         Progress towards goals / Updated goals: PN Last Visit    PLAN  [x]  Upgrade activities as tolerated     []  Continue plan of care  []  Update interventions per flow sheet       []  Discharge due to:_  []  Other:_      Chey Dunlap, PT 9/27/2017

## 2017-09-29 ENCOUNTER — HOSPITAL ENCOUNTER (OUTPATIENT)
Dept: PHYSICAL THERAPY | Age: 57
Discharge: HOME | End: 2017-09-29
Payer: COMMERCIAL

## 2017-09-29 PROCEDURE — 97016 VASOPNEUMATIC DEVICE THERAPY: CPT

## 2017-09-29 PROCEDURE — 97110 THERAPEUTIC EXERCISES: CPT

## 2017-09-29 PROCEDURE — 97140 MANUAL THERAPY 1/> REGIONS: CPT

## 2017-09-29 NOTE — PROGRESS NOTES
PT DAILY TREATMENT NOTE     Patient Name: Jerome Proffer  Date:2017  : 1960  [x]  Patient  Verified  Payor: Alexia Haider / Plan: Loco Kin / Product Type: HMO /    In time:2:55  Out time:4:11  Total Treatment Time (min): 76  Total Timed Codes (min): 70  1:1 Treatment Time (min):    Visit #:     Treatment Area: Right shoulder pain [M25.511]    SUBJECTIVE  Pain Level (0-10 scale): 5/10  Any medication changes, allergies to medications, adverse drug reactions, diagnosis change, or new procedure performed?: [x] No    [] Yes (see summary sheet for update)  Subjective functional status/changes:   [] No changes reported  My shoulder and shoulder blade has been quite a bit more sore since I was here last, I don't know if it was something that I did or doing the new exercises last that got me or what.     OBJECTIVE  Modality rationale: decrease inflammation and decrease pain to improve the patients ability to improve functional abilities   Min Type Additional Details    [] Estim: []Att   []Unatt        []TENS instruct                  []IFC  []Premod   []NMES                     []Other:  []w/US   []w/ice   []w/heat  Position:  Location:    []  Traction: [] Cervical       []Lumbar                       [] Prone          []Supine                       []Intermittent   []Continuous Lbs:  [] before manual  [] after manual    []  Ultrasound: []Continuous   [] Pulsed                           []1MHz   []3MHz Location:  W/cm2:    []  Iontophoresis with dexamethasone         Location: [] Take home patch   [] In clinic    []  Ice     []  heat  []  Ice massage Position:  Location:   10 [x]  Vasopneumatic Device Pressure:       [x] lo [] med [] hi   Temperature: [x] lo [] med [] hi   [] Skin assessment post-treatment:  []intact []redness- no adverse reaction       []redness  adverse reaction:       54 min Therapeutic Exercise:  [x] See flow sheet :   Rationale: increase ROM to improve the patients ability to improve functional abilities    12 min Manual Therapy:  Gentle sidelying scapular mobs, R biceps stretching, gentle PROM into flexion, scaption and ER. Rationale: increase ROM and increase tissue extensibility to improve functional mobility           min Patient Education: [x] Review HEP    [] Progressed/Changed HEP based on:   [] positioning   [] body mechanics   [] transfers   [] heat/ice application        Other Objective/Functional Measures:     Pain Level (0-10 scale) post treatment: 5/10    ASSESSMENT/Changes in Function: Pt presents with chief c/o anterior/superior shoulder soreness after initial introduction to active exercises last tx, but was able to tolerate same regiment again today with min to mod challenge. Pt also presents with moderate scapular shrug arc substitution with long lever scaption AROM which he was able to somewhat control with cueing in front of mirror. Will continue to progress/advance patient within current POC as tolerated with monitoring symptoms. Patient will continue to benefit from skilled PT services to modify and progress therapeutic interventions, address functional mobility deficits, address ROM deficits, analyze and address soft tissue restrictions, analyze and cue movement patterns, analyze and modify body mechanics/ergonomics and assess and modify postural abnormalities to attain remaining goals.      []  See Plan of Care  []  See progress note/recertification  []  See Discharge Summary         Progress towards goals / Updated goals:      PLAN  [x]  Upgrade activities as tolerated     []  Continue plan of care  []  Update interventions per flow sheet       []  Discharge due to:_  []  Other:_      Marvin Ruff PTA 9/29/2017  2:53 PM

## 2017-10-02 ENCOUNTER — HOSPITAL ENCOUNTER (OUTPATIENT)
Dept: PHYSICAL THERAPY | Age: 57
Discharge: HOME | End: 2017-10-02
Payer: COMMERCIAL

## 2017-10-02 PROCEDURE — 97140 MANUAL THERAPY 1/> REGIONS: CPT

## 2017-10-02 PROCEDURE — 97110 THERAPEUTIC EXERCISES: CPT

## 2017-10-02 NOTE — PROGRESS NOTES
PT DAILY TREATMENT NOTE     Patient Name: Lee Machado  Date:10/2/2017  : 1960  [x]  Patient  Verified  Payor: Earle Chen / Plan: Judge Gonsales / Product Type: HMO /    In time:3:00 Out time:4:08  Total Treatment Time (min): 68  Total Timed Codes (min): 62  1:1 Treatment Time (min):    Visit #: 12     Treatment Area: Right shoulder pain [M25.511]    SUBJECTIVE  Pain Level (0-10 scale): 2/10  Any medication changes, allergies to medications, adverse drug reactions, diagnosis change, or new procedure performed?: [x] No    [] Yes (see summary sheet for update)  Subjective functional status/changes:   [] No changes reported  My shoulder has actually been feeling pretty good since I was here last time, I haven't even had any muscle cramping in my biceps lately as well.     OBJECTIVE  Modality rationale: decrease inflammation and decrease pain to improve the patients ability to improve functional abilities   Min Type Additional Details    [] Estim: []Att   []Unatt        []TENS instruct                  []IFC  []Premod   []NMES                     []Other:  []w/US   []w/ice   []w/heat  Position:  Location:    []  Traction: [] Cervical       []Lumbar                       [] Prone          []Supine                       []Intermittent   []Continuous Lbs:  [] before manual  [] after manual    []  Ultrasound: []Continuous   [] Pulsed                           []1MHz   []3MHz Location:  W/cm2:    []  Iontophoresis with dexamethasone         Location: [] Take home patch   [] In clinic    []  Ice     []  heat  []  Ice massage Position:  Location:   10 [x]  Vasopneumatic Device Pressure:       [x] lo [] med [] hi   Temperature: [x] lo [] med [] hi   [] Skin assessment post-treatment:  []intact []redness- no adverse reaction       []redness  adverse reaction:       46 min Therapeutic Exercise:  [x] See flow sheet :   Rationale: increase ROM to improve the patients ability to improve functional abilities    12 min Manual Therapy: sidelying scapular mobs and GH PROM into flexion, scaption and ER. Rationale: increase ROM and increase tissue extensibility to improve functional mobility           min Patient Education: [x] Review HEP    [] Progressed/Changed HEP based on:   [] positioning   [] body mechanics   [] transfers   [] heat/ice application        Other Objective/Functional Measures:     Pain Level (0-10 scale) post treatment: 4/10    ASSESSMENT/Changes in Function: Pt was able to advance to addition of wall ball circles with un weighted ball with min to mod challenge with RTC endurance today. Pt also reports decreased biceps cramping, but continued referred distal UE pain since last tx. Will continue to progress/advance patient within current POC as tolerated with monitoring symptoms. Patient will continue to benefit from skilled PT services to modify and progress therapeutic interventions, address functional mobility deficits, address ROM deficits, address strength deficits, analyze and address soft tissue restrictions, analyze and cue movement patterns and assess and modify postural abnormalities to attain remaining goals.      []  See Plan of Care  []  See progress note/recertification  []  See Discharge Summary         Progress towards goals / Updated goals:      PLAN  [x]  Upgrade activities as tolerated     []  Continue plan of care  []  Update interventions per flow sheet       []  Discharge due to:_  []  Other:_      Urvashi Gonsales, ADALBERTO 10/2/2017  2:59 PM

## 2017-10-06 ENCOUNTER — HOSPITAL ENCOUNTER (OUTPATIENT)
Dept: PHYSICAL THERAPY | Age: 57
Discharge: HOME | End: 2017-10-06
Payer: COMMERCIAL

## 2017-10-06 PROCEDURE — 97140 MANUAL THERAPY 1/> REGIONS: CPT

## 2017-10-06 PROCEDURE — 97110 THERAPEUTIC EXERCISES: CPT

## 2017-10-06 NOTE — PROGRESS NOTES
PT DAILY TREATMENT NOTE     Patient Name: Mya Pink  Date:10/6/2017  : 1960  [x]  Patient  Verified  Payor: Claudeen Lao / Plan: Juan Castañeda / Product Type: HMO /    In time:2:59  Out time:4:00  Total Treatment Time (min): 61  Total Timed Codes (min): 55  1:1 Treatment Time (min):   Visit #: 13 of     Treatment Area: Right shoulder pain [M25.511]    SUBJECTIVE  Pain Level (0-10 scale): 3/10  Any medication changes, allergies to medications, adverse drug reactions, diagnosis change, or new procedure performed?: [x] No    [] Yes (see summary sheet for update)  Subjective functional status/changes:   [] No changes reported  My shoulder is a little bit sore from the doctor pushing on it and stretching it out with the evaluation yesterday and he said that he would go back in come January if it needs to be. OBJECTIVE      49 min Therapeutic Exercise:  [x] See flow sheet :   Rationale: increase ROM and increase strength to improve the patients ability to improve functional abilities    12 min Manual Therapy:  sidelying scapular mobs and GH PROM into flexion, scaption and ER. Rationale: increase ROM and increase tissue extensibility to improve functional mobility           min Patient Education: [x] Review HEP    [] Progressed/Changed HEP based on:   [] positioning   [] body mechanics   [] transfers   [] heat/ice application        Other Objective/Functional Measures:     Pain Level (0-10 scale) post treatment: 4/10    ASSESSMENT/Changes in Function: Pt demonstrates approximately 70% of full passive end range mobility in all planes at approximately 13 weeks post op and should be appropriate to advance to light strengthening protocol soon pending MD approval.Will continue to progress/advance patient within current POC as tolerated with monitoring symptoms.     Patient will continue to benefit from skilled PT services to modify and progress therapeutic interventions, address functional mobility deficits, address ROM deficits, address strength deficits, analyze and address soft tissue restrictions, analyze and cue movement patterns, analyze and modify body mechanics/ergonomics and assess and modify postural abnormalities to attain remaining goals.      []  See Plan of Care  []  See progress note/recertification  []  See Discharge Summary         Progress towards goals / Updated goals:      PLAN  [x]  Upgrade activities as tolerated     []  Continue plan of care  []  Update interventions per flow sheet       []  Discharge due to:_  []  Other:_      Jesse Hewitt PTA 10/6/2017  2:59 PM

## 2017-10-09 ENCOUNTER — HOSPITAL ENCOUNTER (OUTPATIENT)
Dept: PHYSICAL THERAPY | Age: 57
Discharge: HOME | End: 2017-10-09
Payer: COMMERCIAL

## 2017-10-09 PROCEDURE — 97016 VASOPNEUMATIC DEVICE THERAPY: CPT

## 2017-10-09 PROCEDURE — 97110 THERAPEUTIC EXERCISES: CPT

## 2017-10-09 PROCEDURE — 97140 MANUAL THERAPY 1/> REGIONS: CPT

## 2017-10-09 NOTE — PROGRESS NOTES
PT DAILY TREATMENT NOTE     Patient Name: Federico Carter  Date:10/9/2017  : 1960  [x]  Patient  Verified  Payor: Marko Caldwell / Plan: Cristopher Uriarte / Product Type: HMO /    In time:3:02 Out time:4:10  Total Treatment Time (min): 68  Total Timed Codes (min): 62  1:1 Treatment Time (min):    Visit #: 14 of     Treatment Area: Right shoulder pain [M25.511]    SUBJECTIVE  Pain Level (0-10 scale): 2/10  Any medication changes, allergies to medications, adverse drug reactions, diagnosis change, or new procedure performed?: [x] No    [] Yes (see summary sheet for update)  Subjective functional status/changes:   [] No changes reported  My shoulder is kind of stiff and sore from cutting the grass for the first time since I have had surgery yesterday, but the doctor told me that it was ok to do.     OBJECTIVE  Modality rationale: decrease inflammation and decrease pain to improve the patients ability to improve functional abilities   Min Type Additional Details    [] Estim: []Att   []Unatt        []TENS instruct                  []IFC  []Premod   []NMES                     []Other:  []w/US   []w/ice   []w/heat  Position:  Location:    []  Traction: [] Cervical       []Lumbar                       [] Prone          []Supine                       []Intermittent   []Continuous Lbs:  [] before manual  [] after manual    []  Ultrasound: []Continuous   [] Pulsed                           []1MHz   []3MHz Location:  W/cm2:    []  Iontophoresis with dexamethasone         Location: [] Take home patch   [] In clinic    []  Ice     []  heat  []  Ice massage Position:  Location:   10 [x]  Vasopneumatic Device Pressure:       [x] lo [] med [] hi   Temperature: [x] lo [] med [] hi   [] Skin assessment post-treatment:  []intact []redness- no adverse reaction       []redness  adverse reaction:       46 min Therapeutic Exercise:  [x] See flow sheet :   Rationale: increase ROM and increase strength to improve the patients ability to improve functional abilities    12 min Manual Therapy:  sidelying scapular mobs and GH PROM into flexion, scaption and ER. Rationale: increase ROM and increase tissue extensibility to improve functional mobility           min Patient Education: [x] Review HEP    [] Progressed/Changed HEP based on:   [] positioning   [] body mechanics   [] transfers   [] heat/ice application        Other Objective/Functional Measures:     Pain Level (0-10 scale) post treatment: 5/10    ASSESSMENT/Changes in Function: Pt presented with chief c/o moderate increase in shoulder soreness/sxs from increased pushing activity with mowing his lawn since last tx, but was able to tolerate full normal therex regiment with min to mod challenge today. Will continue to progress/advance patient within current POC as tolerated with monitoring symptoms. Patient will continue to benefit from skilled PT services to modify and progress therapeutic interventions, address functional mobility deficits, address ROM deficits, analyze and address soft tissue restrictions, analyze and cue movement patterns, analyze and modify body mechanics/ergonomics and assess and modify postural abnormalities to attain remaining goals.      []  See Plan of Care  []  See progress note/recertification  []  See Discharge Summary         Progress towards goals / Updated goals:      PLAN  [x]  Upgrade activities as tolerated     []  Continue plan of care  []  Update interventions per flow sheet       []  Discharge due to:_  []  Other:_      Radha Arriaga PTA 10/9/2017  3:02 PM

## 2017-10-13 ENCOUNTER — HOSPITAL ENCOUNTER (OUTPATIENT)
Dept: PHYSICAL THERAPY | Age: 57
Discharge: HOME | End: 2017-10-13
Payer: COMMERCIAL

## 2017-10-13 PROCEDURE — 97140 MANUAL THERAPY 1/> REGIONS: CPT

## 2017-10-13 PROCEDURE — 97110 THERAPEUTIC EXERCISES: CPT

## 2017-10-13 NOTE — PROGRESS NOTES
PT DAILY TREATMENT NOTE     Patient Name: Lorenzo Ferrer  Date:10/13/2017  : 1960  [x]  Patient  Verified  Payor: Shay Sherwood / Plan: Sterling Mcintosh / Product Type: HMO /    In time:11:33  Out time:12:30  Total Treatment Time (min): 57  Total Timed Codes (min): 43  1:1 Treatment Time (min):    Visit #: 15 of     Treatment Area: Right shoulder pain [M25.511]    SUBJECTIVE  Pain Level (0-10 scale): 2/10  Any medication changes, allergies to medications, adverse drug reactions, diagnosis change, or new procedure performed?: [x] No    [] Yes (see summary sheet for update)  Subjective functional status/changes:   [] No changes reported  My shoulder is mainly just sore, but I want to keep pushing it so that I can be done with the therapy. I haven't been feeling the cramping into my biceps like I used to though.     OBJECTIVE  Modality rationale: decrease inflammation and decrease pain to improve the patients ability to improve functional abilities   Min Type Additional Details    [] Estim: []Att   []Unatt        []TENS instruct                  []IFC  []Premod   []NMES                     []Other:  []w/US   []w/ice   []w/heat  Position:  Location:    []  Traction: [] Cervical       []Lumbar                       [] Prone          []Supine                       []Intermittent   []Continuous Lbs:  [] before manual  [] after manual    []  Ultrasound: []Continuous   [] Pulsed                           []1MHz   []3MHz Location:  W/cm2:    []  Iontophoresis with dexamethasone         Location: [] Take home patch   [] In clinic   8 [x]  Ice     []  heat  []  Ice massage Position:seated  Location:R shoulder    []  Vasopneumatic Device Pressure:       [] lo [] med [] hi   Temperature: [] lo [] med [] hi   [] Skin assessment post-treatment:  []intact []redness- no adverse reaction       []redness  adverse reaction:       41 min Therapeutic Exercise:  [x] See flow sheet :   Rationale: increase ROM and increase strength to improve the patients ability to improve functional abilities    8 min Manual Therapy:  sidelying scapular mobs and GH PROM into flexion, scaption and ER. Rationale: increase ROM and increase tissue extensibility to improve functional mobility           min Patient Education: [x] Review HEP    [] Progressed/Changed HEP based on:   [] positioning   [] body mechanics   [] transfers   [] heat/ice application        Other Objective/Functional Measures:     Pain Level (0-10 scale) post treatment: 5/10    ASSESSMENT/Changes in Function: Pt was able to advance to introduction to light strengthening protocol with achieving 14 week post op tamara since last tx. Pt was able to advance to addition of medicine ball wall circles, theraband resisted ER/IR and body blade as well as increasing to 1 lb with scaption PRE's to 90 degrees with min to mod challenge today. Will continue to progress/advance patient within current POC as tolerated with monitoring symptoms. Patient will continue to benefit from skilled PT services to modify and progress therapeutic interventions, address functional mobility deficits, address ROM deficits, address strength deficits, analyze and address soft tissue restrictions, analyze and cue movement patterns, analyze and modify body mechanics/ergonomics and assess and modify postural abnormalities to attain remaining goals.      []  See Plan of Care  []  See progress note/recertification  []  See Discharge Summary         Progress towards goals / Updated goals:      PLAN  [x]  Upgrade activities as tolerated     []  Continue plan of care  []  Update interventions per flow sheet       []  Discharge due to:_  []  Other:_      Av Goodman, ADALBERTO 10/13/2017  11:32 AM

## 2017-10-16 ENCOUNTER — HOSPITAL ENCOUNTER (OUTPATIENT)
Dept: PHYSICAL THERAPY | Age: 57
Discharge: HOME | End: 2017-10-16
Payer: COMMERCIAL

## 2017-10-16 PROCEDURE — 97140 MANUAL THERAPY 1/> REGIONS: CPT

## 2017-10-16 PROCEDURE — 97110 THERAPEUTIC EXERCISES: CPT

## 2017-10-16 NOTE — PROGRESS NOTES
PT DAILY TREATMENT NOTE     Patient Name: Inocencia Marshall  Date:10/16/2017  : 1960  [x]  Patient  Verified  Payor: Eugene Sawant / Plan: Avtar Yao / Product Type: HMO /    In time:3:00  Out time:4:34  Total Treatment Time (min): 94  Total Timed Codes (min): 78  1:1 Treatment Time (min):    Visit #: 16 of     Treatment Area: Right shoulder pain [M25.511]    SUBJECTIVE  Pain Level (0-10 scale): 2/10  Any medication changes, allergies to medications, adverse drug reactions, diagnosis change, or new procedure performed?: [x] No    [] Yes (see summary sheet for update)  Subjective functional status/changes:   [] No changes reported  The front of my shoulder and arm has been a little bit more sore since I was here last, I think that it was from doing all of those new exercises working it more last time.     OBJECTIVE  Modality rationale: decrease inflammation and decrease pain to improve the patients ability to improve functional abilities   Min Type Additional Details    [] Estim: []Att   []Unatt        []TENS instruct                  []IFC  []Premod   []NMES                     []Other:  []w/US   []w/ice   []w/heat  Position:  Location:    []  Traction: [] Cervical       []Lumbar                       [] Prone          []Supine                       []Intermittent   []Continuous Lbs:  [] before manual  [] after manual    []  Ultrasound: []Continuous   [] Pulsed                           []1MHz   []3MHz Location:  W/cm2:    []  Iontophoresis with dexamethasone         Location: [] Take home patch   [] In clinic    []  Ice     []  heat  []  Ice massage Position:  Location:   10 [x]  Vasopneumatic Device Pressure:       [x] lo [] med [] hi   Temperature: [x] lo [] med [] hi   [] Skin assessment post-treatment:  []intact []redness- no adverse reaction       []redness  adverse reaction:       72 min Therapeutic Exercise:  [x] See flow sheet :   Rationale: increase ROM and increase strength to improve the patients ability to improve functional abilities    12 min Manual Therapy:  sidelying scapular mobs, supine GH distraction,grade 3 posterior/inferior GH mobs and GH PROM into flexion, scaption and ER. Rationale: increase ROM and increase tissue extensibility to improve functional mobility           min Patient Education: [x] Review HEP    [] Progressed/Changed HEP based on:   [] positioning   [] body mechanics   [] transfers   [] heat/ice application        Other Objective/Functional Measures:     Pain Level (0-10 scale) post treatment: 0    ASSESSMENT/Changes in Function: Pt presented with chief c/o moderate post exercise soreness after increasing to light strengthening protocol last tx, but was able to tolerate same regiment again today except for having to decrease resistance with theraband ER/IR with min to mod challenge today. Pt was also given and instructed on updated HEP to include light strengthening exercises. Will continue to progress/advance patient within current POC as tolerated with monitoring symptoms. Patient will continue to benefit from skilled PT services to modify and progress therapeutic interventions, address functional mobility deficits, address ROM deficits, address strength deficits, analyze and address soft tissue restrictions, analyze and cue movement patterns, analyze and modify body mechanics/ergonomics and assess and modify postural abnormalities to attain remaining goals.      []  See Plan of Care  []  See progress note/recertification  []  See Discharge Summary         Progress towards goals / Updated goals:      PLAN  [x]  Upgrade activities as tolerated     []  Continue plan of care  []  Update interventions per flow sheet       []  Discharge due to:_  []  Other:_      Leno Kumar, ADALBERTO 10/16/2017  2:58 PM

## 2017-10-20 ENCOUNTER — HOSPITAL ENCOUNTER (OUTPATIENT)
Dept: PHYSICAL THERAPY | Age: 57
Discharge: HOME | End: 2017-10-20
Payer: COMMERCIAL

## 2017-10-20 PROCEDURE — 97140 MANUAL THERAPY 1/> REGIONS: CPT

## 2017-10-20 PROCEDURE — 97110 THERAPEUTIC EXERCISES: CPT

## 2017-10-20 NOTE — PROGRESS NOTES
PT DAILY TREATMENT NOTE     Patient Name: Christiano Blevins  Date:10/20/2017  : 1960  [x]  Patient  Verified  Payor: Guy Hendrickson / Plan: Apple Mulligan / Product Type: HMO /    In time:2:54  Out time:4:15  Total Treatment Time (min): 81  Total Timed Codes (min): 75  1:1 Treatment Time (min):    Visit #: 17 of     Treatment Area: Right shoulder pain [M25.511]    SUBJECTIVE  Pain Level (0-10 scale): 5/10  Any medication changes, allergies to medications, adverse drug reactions, diagnosis change, or new procedure performed?: [x] No    [] Yes (see summary sheet for update)  Subjective functional status/changes:   [] No changes reported  I'm not having a good day today, my shoulder and the front of my arm feels really tight and sore for some reason, I don't know if I overdid it at work or if it is the new exercises that I have been doing lately or what.     OBJECTIVE  Modality rationale: decrease inflammation and decrease pain to improve the patients ability to improve functional abilities   Min Type Additional Details    [] Estim: []Att   []Unatt        []TENS instruct                  []IFC  []Premod   []NMES                     []Other:  []w/US   []w/ice   []w/heat  Position:  Location:    []  Traction: [] Cervical       []Lumbar                       [] Prone          []Supine                       []Intermittent   []Continuous Lbs:  [] before manual  [] after manual    []  Ultrasound: []Continuous   [] Pulsed                           []1MHz   []3MHz Location:  W/cm2:    []  Iontophoresis with dexamethasone         Location: [] Take home patch   [] In clinic    []  Ice     []  heat  []  Ice massage Position:  Location:   10 [x]  Vasopneumatic Device Pressure:       [x] lo [] med [] hi   Temperature: [x] lo [] med [] hi   [] Skin assessment post-treatment:  []intact []redness- no adverse reaction       []redness  adverse reaction:       59 min Therapeutic Exercise:  [x] See flow sheet : Rationale: increase ROM and increase strength to improve the patients ability to improve functional abilities    12 min Manual Therapy:  sidelying scapular mobs, supine GH distraction,grade 3 posterior/inferior GH mobs and GH PROM into flexion, scaption and ER. Rationale: increase ROM and increase tissue extensibility to improve functional mobility           min Patient Education: [x] Review HEP    [] Progressed/Changed HEP based on:   [] positioning   [] body mechanics   [] transfers   [] heat/ice application        Other Objective/Functional Measures:     Pain Level (0-10 scale) post treatment: 3-4/10    ASSESSMENT/Changes in Function: Pt presented with chief c/o increased global shoulder and proximal UE soreness since last tx with increased benefit from performing manual intervention at beginning of treatment before therex today. Will review detailed progress/goals for physician update next treatment with continuing to progress/advance within current POC/protocol as tolerated. Patient will continue to benefit from skilled PT services to modify and progress therapeutic interventions, address functional mobility deficits, address ROM deficits, address strength deficits, analyze and address soft tissue restrictions, analyze and cue movement patterns, analyze and modify body mechanics/ergonomics and assess and modify postural abnormalities to attain remaining goals.      []  See Plan of Care  []  See progress note/recertification  []  See Discharge Summary         Progress towards goals / Updated goals:      PLAN  [x]  Upgrade activities as tolerated     []  Continue plan of care  []  Update interventions per flow sheet       []  Discharge due to:_  []  Other:_      Velma Garay PTA 10/20/2017  3:09 PM

## 2017-10-23 ENCOUNTER — HOSPITAL ENCOUNTER (OUTPATIENT)
Dept: PHYSICAL THERAPY | Age: 57
Discharge: HOME | End: 2017-10-23
Payer: COMMERCIAL

## 2017-10-23 PROCEDURE — 97110 THERAPEUTIC EXERCISES: CPT

## 2017-10-23 PROCEDURE — 97140 MANUAL THERAPY 1/> REGIONS: CPT

## 2017-10-23 NOTE — PROGRESS NOTES
PT DAILY TREATMENT NOTE     Patient Name: Loy Dejesus  Date:10/23/2017  : 1960  [x]  Patient  Verified  Payor: Chela Zendejas / Plan: Migel Camacho / Product Type: HMO /    In time:3:07 Out time:4:29  Total Treatment Time (min): 82  Total Timed Codes (min): 76  1:1 Treatment Time (min):    Visit #: 18 of     Treatment Area: Right shoulder pain [M25.511]    SUBJECTIVE  Pain Level (0-10 scale): 2/10  Any medication changes, allergies to medications, adverse drug reactions, diagnosis change, or new procedure performed?: [x] No    [] Yes (see summary sheet for update)  Subjective functional status/changes:   [] No changes reported  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 OBJECTIVE        70 min Therapeutic Exercise:  [x] See flow sheet :   Rationale: increase ROM and increase strength to improve the patients ability to improve functional abilities    12 min Manual Therapy:  sidelying scapular mobs, supine GH distraction,grade 3 posterior/inferior GH mobs and GH PROM into flexion, scaption and ER.    Rationale: increase ROM and increase tissue extensibility to improve functional mobility        min Patient Education: [x] Review HEP    [] Progressed/Changed HEP based on:   [] positioning   [] body mechanics   [] transfers   [] heat/ice application        Other Objective/Functional Measures:     Pain Level (0-10 scale) post treatment: 3/10    ASSESSMENT/Changes in Function:     Patient will continue to benefit from skilled PT services to modify and progress therapeutic interventions, address functional mobility deficits, address ROM deficits, address strength deficits, analyze and address soft tissue restrictions, analyze and cue movement patterns, analyze and modify body mechanics/ergonomics and assess and modify postural abnormalities to attain remaining goals.     []  See Plan of Care  [x]  See progress note/recertification  []  See Discharge Summary         Progress towards goals / Updated goals:  See Progress note/Physician update for full detailed progress towards established goals.     PLAN  [x]  Upgrade activities as tolerated     []  Continue plan of care  []  Update interventions per flow sheet       []  Discharge due to:_  []  Other:_      Garrison Melendez, PTA 10/23/2017  10:46 AM

## 2017-10-23 NOTE — PROGRESS NOTES
[address] [facility], [place] [facility], [facility], [address]  Phone: [phone]   [phone]  PROGRESS NOTE  Patient Name: Mima Flannery : 1960   Treatment/Medical Diagnosis: Right shoulder pain [M25.511]   Referral Source: Corrinne Schaffer, MD     Date of Initial Visit: 17 Attended Visits: 18 Missed Visits: 0     SUMMARY OF TREATMENT  Therapeutic exercise for shoulder/scapular mobility within guidelines of active/light strengthening protocol, postural awareness, manual intervention, cryotherapy and HEP. CURRENT STATUS  Patient reports approximately 75% overall improvement from therapy since initial evaluation with 2-3/10 pain level on average, increased to 6-7/10 at the worst intermittently with over reaching with AROM UE ADL's as well as with end range manual stretching. Pt has just advanced to light strengthening protocol at 14 week post op tamara 4 visits ago with min to mod challenge. Pt would benefit from continued therapy to achieve maximum medical benefit/potential with focusing on AROM and strength progression. Will continue to progress/advance patient within current POC as tolerated with monitoring symptoms. R shoulder AROM= Flexion= 125 degrees; Scaption= 123 degrees; ER= 65 degrees (measured at appx 90 degrees of abduction); IR= Pt is able to actively reach up behind his back into IR planes to R lumbar quadrant/appx L4 level. R shoulder strength measurements/MMT= Flexion= 4/5; Scaption= 4-/5; ER= 4/5; IR= 4+/5  Goal/Measure of Progress Goal Met? 1.  Able to actively lift right shoulder to 90 degrees elevation with minimal substitution   Status at last Eval: Progressing Current Status: Met yes   2. Pt will report at least 75% ability to reach behind back for washing and bathing   Status at [facility] Eval: Progressing Current Status: 60% improvement reported no   3.   Patient will report at least 75% functional overhead reaching ability to improve ADL's   Status at last Eval: Progressing Current Status: 60% improvement reported no   4. Improve FOTO outcome score by 25% to indicate a significant improvement in ADL function   Status at last Eval: 52/100 (41/100 at initial evaluation) Current Status: 57/100 no     New Goals to be achieved in __8__  treatments:  1. Pt will be able to perform AROM scaption PRE's with 2-3 lbs for 2 sets of 10-15 repetitions with good form/minimal scapular substitution to demonstrate improved shoulder/RTC strength/endurance. 2.  Pt will report at least 75% ability to reach behind back for washing and bathing   3. Patient will report at least 75% functional overhead reaching ability to improve ADL's   4. Improve FOTO outcome score by 25% to indicate a significant improvement in ADL function     RECOMMENDATIONS  Continue with current POC for 8 additional visits with advancing as tolerated, then reassess for the need for continuation or discharge from therapy. If you have any questions/comments please contact us directly at [phone]. Thank you for allowing us to assist in the care of your patient. LPTA Signature: James Lane PTA  Date: 10/23/2017   PT Signature: Sam Dunlap PT Time: 10:32 AM   NOTE TO PHYSICIAN:  PLEASE COMPLETE THE ORDERS BELOW AND FAX TO   [facility] UNIT: [phone]. If you are unable to process this request in 24 hours please contact our office: [phone].    ___ I have read the above report and request that my patient continue as recommended.   ___ I have read the above report and request that my patient continue therapy with the following changes/special instructions:_________________________________________________________   ___ I have read the above report and request that my patient be discharged from therapy.      Physician Signature:        Date:       Time:

## 2017-10-27 ENCOUNTER — HOSPITAL ENCOUNTER (OUTPATIENT)
Dept: PHYSICAL THERAPY | Age: 57
Discharge: HOME | End: 2017-10-27
Payer: COMMERCIAL

## 2017-10-27 PROCEDURE — 97140 MANUAL THERAPY 1/> REGIONS: CPT

## 2017-10-27 PROCEDURE — 97110 THERAPEUTIC EXERCISES: CPT

## 2017-10-27 NOTE — PROGRESS NOTES
PT DAILY TREATMENT NOTE     Patient Name: Olivia Odom  Date:10/27/2017  : 1960  [x]  Patient  Verified  Payor: Cindy Morales / Plan: Jayashree Samayoa / Product Type: HMO /    In time:2:53  Out time:4:10  Total Treatment Time (min): 77  Total Timed Codes (min): 71  1:1 Treatment Time (min):    Visit #:     Treatment Area: Right shoulder pain [M25.511]    SUBJECTIVE  Pain Level (0-10 scale): 2/10  Any medication changes, allergies to medications, adverse drug reactions, diagnosis change, or new procedure performed?: [x] No    [] Yes (see summary sheet for update)  Subjective functional status/changes:   [] No changes reported  My shoulder actually feels pretty good today for a change, I didn't really do anything to flare it up over the past couple of days. OBJECTIVE      65 min Therapeutic Exercise:  [x] See flow sheet :   Rationale: increase ROM and increase strength to improve the patients ability to improve functional abilities    12 min Manual Therapy:  sidelying scapular mobs, supine GH distraction,grade 3 posterior/inferior GH mobs and GH PROM into flexion, scaption and ER. Rationale: increase ROM and increase tissue extensibility to improve functional mobility           min Patient Education: [x] Review HEP    [] Progressed/Changed HEP based on:   [] positioning   [] body mechanics   [] transfers   [] heat/ice application        Other Objective/Functional Measures:     Pain Level (0-10 scale) post treatment: 10    ASSESSMENT/Changes in Function: Pt still struggles with ER AROM and strength past neutral plane even with lightly resisted theraband ER, but is making slow but steady progress with AROM and strengthening with all other GH planes. Pt also with continued chief c/o concentrated palpable sub acromial soreness. Will continue to progress/advance patient within current POC as tolerated with monitoring symptoms.     Patient will continue to benefit from skilled PT services to modify and progress therapeutic interventions, address functional mobility deficits, address ROM deficits, address strength deficits, analyze and address soft tissue restrictions, analyze and cue movement patterns, analyze and modify body mechanics/ergonomics and assess and modify postural abnormalities to attain remaining goals.      []  See Plan of Care  []  See progress note/recertification  []  See Discharge Summary         Progress towards goals / Updated goals:      PLAN  [x]  Upgrade activities as tolerated     []  Continue plan of care  []  Update interventions per flow sheet       []  Discharge due to:_  []  Other:_      Ngozi Chow PTA 10/27/2017  2:58 PM

## 2017-11-03 ENCOUNTER — HOSPITAL ENCOUNTER (OUTPATIENT)
Dept: PHYSICAL THERAPY | Age: 57
Discharge: HOME | End: 2017-11-03
Payer: COMMERCIAL

## 2017-11-03 PROCEDURE — 97110 THERAPEUTIC EXERCISES: CPT

## 2017-11-03 PROCEDURE — 97140 MANUAL THERAPY 1/> REGIONS: CPT

## 2017-11-03 NOTE — PROGRESS NOTES
PT DAILY TREATMENT NOTE     Patient Name: Roshan Weathers  Date:11/3/2017  : 1960  [x]  Patient  Verified  Payor: Darryle Blander / Plan: Lane Mejia / Product Type: HMO /    In time:2:58  Out time:4:20  Total Treatment Time (min): 82  Total Timed Codes (min): 66  1:1 Treatment Time (min):    Visit #:     Treatment Area: Right shoulder pain [M25.511]    SUBJECTIVE  Pain Level (0-10 scale): 0  Any medication changes, allergies to medications, adverse drug reactions, diagnosis change, or new procedure performed?: [x] No    [] Yes (see summary sheet for update)  Subjective functional status/changes:   [] No changes reported  My shoulder has been doing better since I was here last and I have still been doing stuff with my shoulder and arm.     OBJECTIVE  Modality rationale: decrease inflammation and decrease pain to improve the patients ability to improve functional abilities   Min Type Additional Details    [] Estim: []Att   []Unatt        []TENS instruct                  []IFC  []Premod   []NMES                     []Other:  []w/US   []w/ice   []w/heat  Position:  Location:    []  Traction: [] Cervical       []Lumbar                       [] Prone          []Supine                       []Intermittent   []Continuous Lbs:  [] before manual  [] after manual    []  Ultrasound: []Continuous   [] Pulsed                           []1MHz   []3MHz Location:  W/cm2:    []  Iontophoresis with dexamethasone         Location: [] Take home patch   [] In clinic    []  Ice     []  heat  []  Ice massage Position:  Location:   10 [x]  Vasopneumatic Device Pressure:       [x] lo [] med [] hi   Temperature: [x] lo [] med [] hi   [] Skin assessment post-treatment:  []intact []redness- no adverse reaction       []redness  adverse reaction:       60 min Therapeutic Exercise:  [x] See flow sheet :   Rationale: increase ROM and increase strength to improve the patients ability to improve functional abilities    12 min Manual Therapy:  sidelying scapular mobs, supine GH distraction,grade 3 posterior/inferior GH mobs and GH PROM into flexion, scaption and ER. Rationale: increase ROM and increase tissue extensibility to improve functional abilities           min Patient Education: [x] Review HEP    [] Progressed/Changed HEP based on:   [] positioning   [] body mechanics   [] transfers   [] heat/ice application        Other Objective/Functional Measures:     Pain Level (0-10 scale) post treatment: 4-5/10    ASSESSMENT/Changes in Function: Pt was able to increase intervals with medicine ball wall circles from 30 to 45 seconds as well as addition of overhead wall lift offs with moderate challenge with RTC strength/endurance today. Will continue to progress/advance patient within current POC as tolerated with monitoring symptoms. Patient will continue to benefit from skilled PT services to modify and progress therapeutic interventions, address functional mobility deficits, address ROM deficits, address strength deficits, analyze and address soft tissue restrictions, analyze and cue movement patterns, analyze and modify body mechanics/ergonomics and assess and modify postural abnormalities to attain remaining goals.      []  See Plan of Care  []  See progress note/recertification  []  See Discharge Summary         Progress towards goals / Updated goals:      PLAN  [x]  Upgrade activities as tolerated     []  Continue plan of care  []  Update interventions per flow sheet       []  Discharge due to:_  []  Other:_      Trina Powell PTA 11/3/2017  3:09 PM

## 2017-11-06 ENCOUNTER — HOSPITAL ENCOUNTER (OUTPATIENT)
Dept: PHYSICAL THERAPY | Age: 57
Discharge: HOME | End: 2017-11-06
Payer: COMMERCIAL

## 2017-11-06 PROCEDURE — 97140 MANUAL THERAPY 1/> REGIONS: CPT

## 2017-11-06 PROCEDURE — 97110 THERAPEUTIC EXERCISES: CPT

## 2017-11-06 NOTE — PROGRESS NOTES
PT DAILY TREATMENT NOTE     Patient Name: Shanthi Hager  Date:2017  : 1960  [x]  Patient  Verified  Payor: Roland Otto / Plan: Amada Mayfield / Product Type: HMO /    In time:3:30  Out time:4:39  Total Treatment Time (min): 69  Total Timed Codes (min): 69  1:1 Treatment Time (min):    Visit #:  of 26    Treatment Area: Right shoulder pain [M25.511]    SUBJECTIVE  Pain Level (0-10 scale): 2  Any medication changes, allergies to medications, adverse drug reactions, diagnosis change, or new procedure performed?: [x] No    [] Yes (see summary sheet for update)  Subjective functional status/changes:   [] No changes reported  Pt reports the arm is still pretty weak, and the reaching out to the side and behind the back are limited.      OBJECTIVE  Modality rationale: Pt deferred   Min Type Additional Details    [] Estim: []Att   []Unatt        []TENS instruct                  []IFC  []Premod   []NMES                     []Other:  []w/US   []w/ice   []w/heat  Position:  Location:    []  Traction: [] Cervical       []Lumbar                       [] Prone          []Supine                       []Intermittent   []Continuous Lbs:  [] before manual  [] after manual    []  Ultrasound: []Continuous   [] Pulsed                           []1MHz   []3MHz Location:  W/cm2:    []  Iontophoresis with dexamethasone         Location: [] Take home patch   [] In clinic    []  Ice     []  heat  []  Ice massage Position:  Location:    []  Vasopneumatic Device Pressure:       [] lo [] med [] hi   Temperature: [] lo [] med [] hi   [] Skin assessment post-treatment:  []intact []redness- no adverse reaction       []redness  adverse reaction:       57 min Therapeutic Exercise:  [] See flow sheet :   Rationale: increase ROM and increase strength to improve the patients ability to change upper limb position for lifting, reaching and dressing tasks      12 min Manual Therapy:  Gr 3 GH A-P, inf mobilization, distraction   Rationale: increase ROM and increase tissue extensibility to improve the patient's ability to change upper limb position for lifting, reaching and dressing tasks    Pain Level (0-10 scale) post treatment: 4    ASSESSMENT/Changes in Function: Pt was shown external rotation stretching in door frame as viable alternative to help ROM restrictions during home and work day. Patient will continue to benefit from skilled PT services to address ROM deficits, address strength deficits, analyze and address soft tissue restrictions and analyze and cue movement patterns to attain remaining goals.      []  See Plan of Care  []  See progress note/recertification  []  See Discharge Summary           PLAN  []  Upgrade activities as tolerated     [x]  Continue plan of care  []  Update interventions per flow sheet       []  Discharge due to:_  []  Other:_      Arlin Roe, PT 11/6/2017  3:24 PM

## 2017-11-09 ENCOUNTER — HOSPITAL ENCOUNTER (OUTPATIENT)
Dept: PHYSICAL THERAPY | Age: 57
Discharge: HOME | End: 2017-11-09
Payer: COMMERCIAL

## 2017-11-09 PROCEDURE — 97140 MANUAL THERAPY 1/> REGIONS: CPT

## 2017-11-09 PROCEDURE — 97110 THERAPEUTIC EXERCISES: CPT

## 2017-11-09 NOTE — PROGRESS NOTES
PT DAILY TREATMENT NOTE     Patient Name: Charles Gutierrez  Date:2017  : 1960  [x]  Patient  Verified  Payor: Pasquale Oscar / Plan: Gayatri Else / Product Type: HMO /    In time:2:57  Out time:3:57  Total Treatment Time (min): 60  Total Timed Codes (min): 60  1:1 Treatment Time (min):    Visit #:  of 26    Treatment Area: Right shoulder pain [M25.511]    SUBJECTIVE  Pain Level (0-10 scale): 4  Any medication changes, allergies to medications, adverse drug reactions, diagnosis change, or new procedure performed?: [x] No    [] Yes (see summary sheet for update)  Subjective functional status/changes:   [] No changes reported  I'm sore today in the front of the shoulder    OBJECTIVE    48 min Therapeutic Exercise:  [] See flow sheet :   Rationale: increase ROM and increase strength to improve the patients ability to change upper limb position for lifting, reaching and dressing tasks    12 min Manual Therapy:  Gr 3-4 GH mobilizations, PROM in all planes   Rationale: increase tissue extensibility to improve shoulder mobility    Other Objective/Functional Measures:     Pain Level (0-10 scale) post treatment: 3-4    ASSESSMENT/Changes in Function: Pt requires cueing on external rotation stretching in doorway. Once corrected, pt reported significant increase in tension from shoulder stretch. Patient will continue to benefit from skilled PT services to modify and progress therapeutic interventions, address ROM deficits, address strength deficits, analyze and address soft tissue restrictions and analyze and cue movement patterns to attain remaining goals.      []  See Plan of Care  []  See progress note/recertification  []  See Discharge Summary         PLAN  []  Upgrade activities as tolerated     [x]  Continue plan of care  []  Update interventions per flow sheet       []  Discharge due to:_  []  Other:_      Anika Velazquez, PT 2017  2:59 PM

## 2017-11-13 ENCOUNTER — HOSPITAL ENCOUNTER (OUTPATIENT)
Dept: PHYSICAL THERAPY | Age: 57
Discharge: HOME | End: 2017-11-13
Payer: COMMERCIAL

## 2017-11-13 PROCEDURE — 97140 MANUAL THERAPY 1/> REGIONS: CPT

## 2017-11-13 PROCEDURE — 97110 THERAPEUTIC EXERCISES: CPT

## 2017-11-17 ENCOUNTER — HOSPITAL ENCOUNTER (OUTPATIENT)
Dept: PHYSICAL THERAPY | Age: 57
Discharge: HOME | End: 2017-11-17
Payer: COMMERCIAL

## 2017-11-21 ENCOUNTER — HOSPITAL ENCOUNTER (OUTPATIENT)
Dept: PHYSICAL THERAPY | Age: 57
Discharge: HOME | End: 2017-11-21
Payer: COMMERCIAL

## 2017-11-21 PROCEDURE — 97140 MANUAL THERAPY 1/> REGIONS: CPT

## 2017-11-21 PROCEDURE — 97110 THERAPEUTIC EXERCISES: CPT

## 2017-11-21 NOTE — PROGRESS NOTES
PT DAILY TREATMENT NOTE     Patient Name: Christiano Blevins  Date:2017  : 1960  [x]  Patient  Verified  Payor: Guy Hendrickson / Plan: Apple Mulligan / Product Type: HMO /    In time:300  Out time:416  Total Treatment Time (min): 76  Visit #: 24 of 26    Treatment Area: Right shoulder pain [M25.511]    SUBJECTIVE  Pain Level (0-10 scale): 2  Any medication changes, allergies to medications, adverse drug reactions, diagnosis change, or new procedure performed?: [x] No    [] Yes (see summary sheet for update)  Subjective functional status/changes:   [] No changes reported  Pt reported having a rough few days due to his mom passing away    OBJECTIVE    Vaso x 10 minutes to the R shoulder in sitting    51 min Therapeutic Exercise:  [x] See flow sheet :   Rationale: increase ROM and increase strength to improve the patients ability to improve functional abilities    15 min Manual Therapy:  Gr 3-4 GH mobilizations, PROM in all planes   Rationale: increase ROM and increase tissue extensibility to improve functional mobility            min Patient Education: [x] Review HEP    [] Progressed/Changed HEP based on:   [] positioning   [] body mechanics   [] transfers   [] heat/ice application        Other Objective/Functional Measures:     Pain Level (0-10 scale) post treatment: 3    ASSESSMENT/Changes in Function: Pt demonstrated moderate stiffness in the R shoulder with pain into IR with the strap. Continues to progress, however slowly with AROM without substitution and strengthening. Will review goals for formal PN next visit.      Patient will continue to benefit from skilled PT services to modify and progress therapeutic interventions, address functional mobility deficits, address ROM deficits, address strength deficits, analyze and address soft tissue restrictions, analyze and cue movement patterns, analyze and modify body mechanics/ergonomics and assess and modify postural abnormalities to attain remaining goals.      []  See Plan of Care  []  See progress note/recertification  []  See Discharge Summary         Progress towards goals / Updated goals:      PLAN  [x]  Upgrade activities as tolerated     []  Continue plan of care  []  Update interventions per flow sheet       []  Discharge due to:_  [x]  Other:PN MATA Dunlap, PT 11/21/2017

## 2017-11-27 ENCOUNTER — HOSPITAL ENCOUNTER (OUTPATIENT)
Dept: PHYSICAL THERAPY | Age: 57
Discharge: HOME | End: 2017-11-27
Payer: COMMERCIAL

## 2017-11-27 PROCEDURE — 97110 THERAPEUTIC EXERCISES: CPT

## 2017-11-27 NOTE — PROGRESS NOTES
PT DAILY TREATMENT NOTE     Patient Name: Shanthi Hager  Date:2017  : 1960  [x]  Patient  Verified  Payor: Roland Otto / Plan: Amada Mayfield / Product Type: HMO /    In time:3:07  Out time:4:14  Total Treatment Time (min): 67  Total Timed Codes (min): 61  1:1 Treatment Time (min):    Visit #:     Treatment Area: Right shoulder pain [M25.511]    SUBJECTIVE  Pain Level (0-10 scale): 2/10  Any medication changes, allergies to medications, adverse drug reactions, diagnosis change, or new procedure performed?: [x] No    [] Yes (see summary sheet for update)  Subjective functional status/changes:   [] No changes reported  My shoulder has been feeling better, but I don't feel like I am all the way there yet because there is still a lot that I can't do yet. OBJECTIVE      67 min Therapeutic Exercise:  [x] See flow sheet :   Rationale: increase ROM and increase strength to improve the patients ability to improve functional abilities           min Patient Education: [x] Review HEP    [] Progressed/Changed HEP based on:   [] positioning   [] body mechanics   [] transfers   [] heat/ice application        Other Objective/Functional Measures:     Pain Level (0-10 scale) post treatment: 2/10    ASSESSMENT/Changes in Function:      []  See Plan of Care  []  See progress note/recertification  [x]  See Discharge Summary         Progress towards goals / Updated goals:  See discharge summary for full final detailed progress towards all established goals. PLAN  []  Upgrade activities as tolerated     []  Continue plan of care  []  Update interventions per flow sheet       [x]  Discharge due to:Patient has achieved maximum medical benefit/potential from current POC after completing 25 of 26 prescribed visits and is ready for discharge from therapy at this time.   []  Other:_      Av Goodamn, PTA 2017  3:10 PM

## 2017-11-27 NOTE — PROGRESS NOTES
[facility] [place] [address], [place], [address] [place] [address] Road  Phone: [phone]   [phone]  DISCHARGE SUMMARY  Patient Name: Tanya Saint : 1960   Treatment/Medical Diagnosis: Right shoulder pain [M25.511]   Referral Source: Konrad High MD     Date of Initial Visit: 17 Attended Visits: 25 Missed Visits: 1     SUMMARY OF TREATMENT  Therapeutic exercise for shoulder/scapular mobility within guidelines of active/light strengthening protocol, postural awareness, manual intervention, cryotherapy and HEP. CURRENT STATUS  Patient reports approximately 75% overall improvement from therapy since initial evaluation with 3/10 pain level on average, increased to 5/10 at the worst after prolonged lifting and reaching ADL's >1-1 1/2 hours as well as prolonged sidelying on involved side. Pt has made steady improvement with gaining shoulder/RTC mobility and strength with advancing within current POC, but understands that he must continue with HEP over the next few months on his own to achieve maximum potential due to size of RTC tear with reviewing long term prognosis today. Pt was given and instructed in an updated HEP for continued self maintenance of reduced symptoms after D/C from therapy. R shoulder AROM: Flexion= 135 degrees; Scaption= 130 degrees; ER= 60 degrees (measured at appx 90 degrees of abduction); IR= Pt is able to actively reach up behind his back into IR planes to R lumbar quadrant/appx L3 level. R shoulder strength measurements/MMT: Flexion= 4/5; Scaption= 4/5; ER= 4/5; IR= 5/5    Goal/Measure of Progress Goal Met? 1. Pt will be able to perform AROM scaption PRE's with 2-3 lbs for 2 sets of 10-15 repetitions with good form/minimal scapular substitution to demonstrate improved shoulder/RTC strength/endurance. Status at last Eval: Progressing Current Status: Improved, but Not Met no   2.   Pt will report at least 75% ability to reach behind back for washing and bathing   Status at last Eval: 60% improvement reported Current Status: 80% improvement reported yes   3. Patient will report at least 75% functional overhead reaching ability to improve ADL's   Status at last Eval: 60% improvement reported Current Status: 65% improvement reported no   4. Improve FOTO outcome score by 25% to indicate a significant improvement in ADL function   Status at last Eval: 57/100 Current Status: 64/100 no     RECOMMENDATIONS  Patient has achieved maximum medical benefit/potential from current POC after completing 25 of 26 prescribed visits and is ready for discharge from therapy at this time. If you have any questions/comments please contact us directly at [phone]. Thank you for allowing us to assist in the care of your patient.     LPTA Signature: James Lane PTA Date: 11/27/17   Therapist Signature: JOHANA Sainz OCS   Time: 3:14 PM

## 2023-12-24 NOTE — PROGRESS NOTES
PT DAILY TREATMENT NOTE     Patient Name: Rachael Olivia  Date:2017  : 1960  [x]  Patient  Verified  Payor: Jesus Jacobo / Plan: Debra Murphy / Product Type: HMO /    In time:3:06  Out time:4:17  Total Treatment Time (min): 71  Total Timed Codes (min): 65  1:1 Treatment Time (min):    Visit #:  of     Treatment Area: Right shoulder pain [M25.511]    SUBJECTIVE  Pain Level (0-10 scale): 3/10  Any medication changes, allergies to medications, adverse drug reactions, diagnosis change, or new procedure performed?: [x] No    [] Yes (see summary sheet for update)  Subjective functional status/changes:   [] No changes reported  My shoulder is still really sore in the front crease where it always is, but there is not much of a difference otherwise. OBJECTIVE      60 min Therapeutic Exercise:  [x] See flow sheet :   Rationale: increase ROM and increase strength to improve the patients ability to improve functional abilities    11 min Manual Therapy:  Gr 3-4 GH mobilizations, PROM in all planes   Rationale: increase ROM and increase tissue extensibility to improve functional mobility            min Patient Education: [x] Review HEP    [] Progressed/Changed HEP based on:   [] positioning   [] body mechanics   [] transfers   [] heat/ice application        Other Objective/Functional Measures:     Pain Level (0-10 scale) post treatment: 4/10    ASSESSMENT/Changes in Function: Pt was able to increase interval time from 30 to 45 seconds with medicine ball wall circles with moderate challenge with RTC stability/endurance today. Will continue to progress/advance patient within current POC as tolerated with monitoring symptoms.     Patient will continue to benefit from skilled PT services to modify and progress therapeutic interventions, address functional mobility deficits, address ROM deficits, address strength deficits, analyze and address soft tissue restrictions, analyze and cue movement You must finish all the prescribed antibiotics. THERE SHOULD BE NO LEFT OVER PILLS!! Use tylenol and motrin for fever control.   Alternate doses of each at three hour intervals for temperature over 100.4 F  Push fluids  Call your doctor or the follow up doctor to set up appointment for recheck visit  Return to ER for any worsening symptoms or new problems which may arise patterns, analyze and modify body mechanics/ergonomics and assess and modify postural abnormalities to attain remaining goals.      []  See Plan of Care  []  See progress note/recertification  []  See Discharge Summary         Progress towards goals / Updated goals:      PLAN  [x]  Upgrade activities as tolerated     []  Continue plan of care  []  Update interventions per flow sheet       []  Discharge due to:_  []  Other:_      Dion Pineda, PTA 11/13/2017  3:04 PM